# Patient Record
Sex: FEMALE | Race: WHITE | URBAN - METROPOLITAN AREA
[De-identification: names, ages, dates, MRNs, and addresses within clinical notes are randomized per-mention and may not be internally consistent; named-entity substitution may affect disease eponyms.]

---

## 2023-12-22 ENCOUNTER — APPOINTMENT (RX ONLY)
Dept: URBAN - METROPOLITAN AREA CLINIC 43 | Facility: CLINIC | Age: 73
Setting detail: DERMATOLOGY
End: 2023-12-22

## 2023-12-22 DIAGNOSIS — L90.8 OTHER ATROPHIC DISORDERS OF SKIN: ICD-10-CM

## 2023-12-22 DIAGNOSIS — L73.8 OTHER SPECIFIED FOLLICULAR DISORDERS: ICD-10-CM

## 2023-12-22 PROCEDURE — ? COUNSELING

## 2023-12-22 PROCEDURE — ? TREATMENT REGIMEN

## 2023-12-22 PROCEDURE — ? PRESCRIPTION

## 2023-12-22 PROCEDURE — 99202 OFFICE O/P NEW SF 15 MIN: CPT

## 2023-12-22 RX ORDER — TRETIONIN 0.25 MG/G
CREAM TOPICAL
Qty: 45 | Refills: 4 | Status: ERX | COMMUNITY
Start: 2023-12-22

## 2023-12-22 RX ADMIN — TRETIONIN: 0.25 CREAM TOPICAL at 00:00

## 2023-12-22 ASSESSMENT — LOCATION SIMPLE DESCRIPTION DERM
LOCATION SIMPLE: LEFT FOREHEAD
LOCATION SIMPLE: RIGHT CHEEK

## 2023-12-22 ASSESSMENT — LOCATION DETAILED DESCRIPTION DERM
LOCATION DETAILED: RIGHT INFERIOR CENTRAL MALAR CHEEK
LOCATION DETAILED: RIGHT SUPERIOR NASAL CHEEK
LOCATION DETAILED: LEFT FOREHEAD

## 2023-12-22 ASSESSMENT — LOCATION ZONE DERM: LOCATION ZONE: FACE

## 2023-12-22 NOTE — PROCEDURE: TREATMENT REGIMEN
Plan: Skin care regimen:\\n\\nWash face gently, twice daily. May use water or a gentle cleanser like CeraVe Hydrating cleanser. \\n\\Tess the morning, after washing face, pat-dry. Then apply a vitamin C serum. Allow serum to dry. Then apply a moisturizer and sunscreen of SPF 30 or above. \\n\\Stephan night, after washing face, pat-dry. Then apply a pea-sized amount of Tretinoin 0.025% cream to face. Then use a moisturizing cream. Start with just 2 nights per week x 2 weeks, then add in 1 night per week as tolerated. Tretinoin can make you feel more sensitive to the sun and can cause redness and flaking. If it is too irritating, try applying it AFTER moisturizing cream. Stop using the Adriano retinol cream. \\n\\nRecommended brands: \\nVitamin C serum - CeraVe vitamin C serum (lower cost, available at [facility] and other pharmacies), [employer] Vitamin C serum (mid-cost, available on [employer] website) and Skinceuticals C E Ferulic serum (Very expensive, available on [employer]inceuticals website) \\n\\nFor eye cream: CeraVe Eye cream (lower cost, evailable at pharmacies like [facility]), [employer] Total Eye (expensive, available on [employer] website)
Detail Level: Zone